# Patient Record
Sex: FEMALE | Race: WHITE
[De-identification: names, ages, dates, MRNs, and addresses within clinical notes are randomized per-mention and may not be internally consistent; named-entity substitution may affect disease eponyms.]

---

## 2021-11-20 ENCOUNTER — RX ONLY (OUTPATIENT)
Age: 51
Setting detail: RX ONLY
End: 2021-11-20

## 2021-11-20 ENCOUNTER — APPOINTMENT (RX ONLY)
Dept: URBAN - METROPOLITAN AREA CLINIC 41 | Facility: CLINIC | Age: 51
Setting detail: DERMATOLOGY
End: 2021-11-20

## 2021-11-20 DIAGNOSIS — I89.0 LYMPHEDEMA, NOT ELSEWHERE CLASSIFIED: ICD-10-CM | Status: INADEQUATELY CONTROLLED

## 2021-11-20 PROCEDURE — ? COUNSELING

## 2021-11-20 PROCEDURE — 99214 OFFICE O/P EST MOD 30 MIN: CPT

## 2021-11-20 PROCEDURE — ? ADDITIONAL NOTES

## 2021-11-20 PROCEDURE — ? PRESCRIPTION

## 2021-11-20 PROCEDURE — ? PRESCRIPTION MEDICATION MANAGEMENT

## 2021-11-20 PROCEDURE — ? MEDICATION COUNSELING

## 2021-11-20 RX ORDER — CEPHALEXIN 250 MG/5ML
POWDER, FOR SUSPENSION ORAL
Qty: 280 | Refills: 0 | COMMUNITY
Start: 2021-11-20

## 2021-11-20 RX ORDER — MUPIROCIN 20 MG/G
OINTMENT TOPICAL
Qty: 22 | Refills: 1 | COMMUNITY
Start: 2021-11-20

## 2021-11-20 RX ORDER — TRIAMCINOLONE ACETONIDE 1 MG/G
OINTMENT TOPICAL
Qty: 80 | Refills: 2 | Status: ERX

## 2021-11-20 RX ORDER — TRIAMCINOLONE ACETONIDE 1 MG/G
OINTMENT TOPICAL
Qty: 80 | Refills: 2 | COMMUNITY
Start: 2021-11-20

## 2021-11-20 RX ORDER — CEPHALEXIN 250 MG/5ML
POWDER, FOR SUSPENSION ORAL
Qty: 200 | Refills: 1 | Status: ERX

## 2021-11-20 RX ADMIN — CEPHALEXIN: 250 POWDER, FOR SUSPENSION ORAL at 00:00

## 2021-11-20 RX ADMIN — MUPIROCIN: 20 OINTMENT TOPICAL at 00:00

## 2021-11-20 RX ADMIN — TRIAMCINOLONE ACETONIDE: 1 OINTMENT TOPICAL at 00:00

## 2021-11-20 ASSESSMENT — LOCATION ZONE DERM: LOCATION ZONE: LEG

## 2021-11-20 ASSESSMENT — LOCATION SIMPLE DESCRIPTION DERM
LOCATION SIMPLE: RIGHT PRETIBIAL REGION
LOCATION SIMPLE: LEFT PRETIBIAL REGION

## 2021-11-20 ASSESSMENT — LOCATION DETAILED DESCRIPTION DERM
LOCATION DETAILED: LEFT PROXIMAL PRETIBIAL REGION
LOCATION DETAILED: RIGHT PROXIMAL PRETIBIAL REGION

## 2021-11-20 NOTE — PROCEDURE: MEDICATION COUNSELING
- Anemia likely due to LELO, and anemia of chronic disease in setting of Stage 5 CKD  - Fe panel from 2/2020 showed low total iron, low total iron binding capacity  - hemoglobin has been somewhat labile during admission; monitor closely, no sign of acute bleeding  - Continue home ferrous sulfate   - Continue epogen as per nephro Acitretin Pregnancy And Lactation Text: This medication is Pregnancy Category X and should not be given to women who are pregnant or may become pregnant in the future. This medication is excreted in breast milk.

## 2021-11-20 NOTE — PROCEDURE: MEDICATION COUNSELING
Faxed documents to [facility] [phone]   Griseofulvin Pregnancy And Lactation Text: This medication is Pregnancy Category X and is known to cause serious birth defects. It is unknown if this medication is excreted in breast milk but breast feeding should be avoided.

## 2021-11-20 NOTE — HPI: BUMPS
Is This A New Presentation, Or A Follow-Up?: Bumps
Additional History: Sharp pains in lower legs. Pt out of triamcinolone ointment. Diagnosed as elephantiasis nostras verrucosa. Never started compression therapy for this condition.\\n\\nEst to BG, new to NG

## 2021-11-20 NOTE — PROCEDURE: PRESCRIPTION MEDICATION MANAGEMENT
Initiate Treatment: Cephalexin \\nMupirocin ointment BID
Detail Level: Zone
Render In Strict Bullet Format?: No
Continue Regimen: Triamcinolone 0.1% ointment BID

## 2021-11-20 NOTE — PROCEDURE: MEDICATION COUNSELING
Please advise her that the sugar levels are high in the average 250 range  A1C is at 10 and needs to be controlled better  For now continue with the medication and low carb diet  Will discuss options for diabetes therapy next visit  Patient has a visit set up early 2/2020  Thanks  Birth Control Pills Pregnancy And Lactation Text: This medication should be avoided if pregnant and for the first 30 days post-partum.

## 2021-11-23 ENCOUNTER — RX ONLY (OUTPATIENT)
Age: 51
Setting detail: RX ONLY
End: 2021-11-23

## 2021-11-23 RX ORDER — TRIAMCINOLONE ACETONIDE 1 MG/G
OINTMENT TOPICAL
Qty: 454 | Refills: 1 | Status: ERX

## 2022-09-22 ENCOUNTER — APPOINTMENT (RX ONLY)
Dept: URBAN - METROPOLITAN AREA CLINIC 41 | Facility: CLINIC | Age: 52
Setting detail: DERMATOLOGY
End: 2022-09-22

## 2022-09-22 DIAGNOSIS — I89.0 LYMPHEDEMA, NOT ELSEWHERE CLASSIFIED: ICD-10-CM | Status: INADEQUATELY CONTROLLED

## 2022-09-22 PROCEDURE — ? ADDITIONAL NOTES

## 2022-09-22 PROCEDURE — 99214 OFFICE O/P EST MOD 30 MIN: CPT

## 2022-09-22 PROCEDURE — ? PRESCRIPTION

## 2022-09-22 PROCEDURE — ? PRESCRIPTION MEDICATION MANAGEMENT

## 2022-09-22 PROCEDURE — ? TREATMENT REGIMEN

## 2022-09-22 PROCEDURE — ? COUNSELING

## 2022-09-22 RX ORDER — CEPHALEXIN 250 MG/5ML
POWDER, FOR SUSPENSION ORAL
Qty: 200 | Refills: 1 | Status: ERX

## 2022-09-22 RX ORDER — TRIAMCINOLONE ACETONIDE 1 MG/G
OINTMENT TOPICAL
Qty: 454 | Refills: 5 | Status: ERX

## 2022-09-22 ASSESSMENT — LOCATION SIMPLE DESCRIPTION DERM
LOCATION SIMPLE: RIGHT PRETIBIAL REGION
LOCATION SIMPLE: LEFT PRETIBIAL REGION

## 2022-09-22 ASSESSMENT — LOCATION ZONE DERM: LOCATION ZONE: LEG

## 2022-09-22 ASSESSMENT — LOCATION DETAILED DESCRIPTION DERM
LOCATION DETAILED: LEFT DISTAL PRETIBIAL REGION
LOCATION DETAILED: RIGHT PROXIMAL PRETIBIAL REGION

## 2022-09-22 NOTE — PROCEDURE: ADDITIONAL NOTES
Additional Notes: Patient consent was obtained to proceed with the visit and recommended plan of care after discussion of all risks and benefits, including the risks of COVID-19 exposure.
Detail Level: Simple
Additional Notes: BG recommends compression socks to help circulation
Render Risk Assessment In Note?: no

## 2022-09-22 NOTE — PROCEDURE: PRESCRIPTION MEDICATION MANAGEMENT
Render In Strict Bullet Format?: No
Detail Level: Zone
Continue Regimen: Triamcinolone 0.1 bid\\nKephlex 500 mg bid x 2weeks

## 2023-05-18 ENCOUNTER — APPOINTMENT (RX ONLY)
Dept: URBAN - METROPOLITAN AREA CLINIC 41 | Facility: CLINIC | Age: 53
Setting detail: DERMATOLOGY
End: 2023-05-18

## 2023-05-18 DIAGNOSIS — I89.0 LYMPHEDEMA, NOT ELSEWHERE CLASSIFIED: ICD-10-CM | Status: INADEQUATELY CONTROLLED

## 2023-05-18 PROCEDURE — ? COUNSELING

## 2023-05-18 PROCEDURE — ? PRESCRIPTION MEDICATION MANAGEMENT

## 2023-05-18 PROCEDURE — 99214 OFFICE O/P EST MOD 30 MIN: CPT

## 2023-05-18 PROCEDURE — ? PRESCRIPTION

## 2023-05-18 PROCEDURE — ? ADDITIONAL NOTES

## 2023-05-18 RX ORDER — TRIAMCINOLONE ACETONIDE 1 MG/G
OINTMENT TOPICAL
Qty: 454 | Refills: 5 | Status: ERX

## 2023-05-18 ASSESSMENT — LOCATION SIMPLE DESCRIPTION DERM
LOCATION SIMPLE: RIGHT PRETIBIAL REGION
LOCATION SIMPLE: LEFT PRETIBIAL REGION

## 2023-05-18 ASSESSMENT — LOCATION ZONE DERM: LOCATION ZONE: LEG

## 2023-05-18 ASSESSMENT — LOCATION DETAILED DESCRIPTION DERM
LOCATION DETAILED: RIGHT PROXIMAL PRETIBIAL REGION
LOCATION DETAILED: LEFT DISTAL PRETIBIAL REGION

## 2023-05-18 NOTE — PROCEDURE: COUNSELING
Patient Specific Counseling (Will Not Stick From Patient To Patient): BG counsels that these are draining, but there is no active infection. BG counsels that pt needs consistent compression. BG counsels that pt needs an air compression device. BG counsels that this is likely the only thing that will help pt.
Detail Level: Detailed

## 2024-04-09 ENCOUNTER — APPOINTMENT (RX ONLY)
Dept: URBAN - METROPOLITAN AREA CLINIC 41 | Facility: CLINIC | Age: 54
Setting detail: DERMATOLOGY
End: 2024-04-09

## 2024-04-09 DIAGNOSIS — I89.0 LYMPHEDEMA, NOT ELSEWHERE CLASSIFIED: ICD-10-CM | Status: INADEQUATELY CONTROLLED

## 2024-04-09 PROCEDURE — ? PRESCRIPTION

## 2024-04-09 PROCEDURE — ? ADDITIONAL NOTES

## 2024-04-09 PROCEDURE — ? COUNSELING

## 2024-04-09 PROCEDURE — ? PRESCRIPTION MEDICATION MANAGEMENT

## 2024-04-09 PROCEDURE — 99214 OFFICE O/P EST MOD 30 MIN: CPT

## 2024-04-09 RX ORDER — CEPHALEXIN 250 MG/5ML
POWDER, FOR SUSPENSION ORAL
Qty: 1300 | Refills: 0 | Status: ERX

## 2024-04-09 ASSESSMENT — LOCATION SIMPLE DESCRIPTION DERM
LOCATION SIMPLE: RIGHT PRETIBIAL REGION
LOCATION SIMPLE: LEFT PRETIBIAL REGION

## 2024-04-09 ASSESSMENT — LOCATION DETAILED DESCRIPTION DERM
LOCATION DETAILED: LEFT DISTAL PRETIBIAL REGION
LOCATION DETAILED: RIGHT PROXIMAL PRETIBIAL REGION

## 2024-04-09 ASSESSMENT — LOCATION ZONE DERM: LOCATION ZONE: LEG

## 2024-04-09 NOTE — PROCEDURE: PRESCRIPTION MEDICATION MANAGEMENT
Initiate Treatment: Cephalexin 250/5 for 4 times a day.
Render In Strict Bullet Format?: No
Detail Level: Zone
Continue Regimen: Triamcinolone 0.1 bid

## 2024-04-09 NOTE — PROCEDURE: ADDITIONAL NOTES
Additional Notes: BG states that pt should keep legs elevated and pt should keep up with Pneumatic compression. BG counsels pt that without the compression pt runs the risk of cellulitis. \\nThe L leg is infected today and pt can take course of antibiotics to calm it down. Pt states she is unable to take pills, BG states pt can take oral medication instead.
Detail Level: Simple
Render Risk Assessment In Note?: no

## 2025-02-21 ENCOUNTER — APPOINTMENT (OUTPATIENT)
Dept: URBAN - METROPOLITAN AREA CLINIC 41 | Facility: CLINIC | Age: 55
Setting detail: DERMATOLOGY
End: 2025-02-21

## 2025-02-21 ENCOUNTER — RX ONLY (RX ONLY)
Age: 55
End: 2025-02-21

## 2025-02-21 DIAGNOSIS — I89.0 LYMPHEDEMA, NOT ELSEWHERE CLASSIFIED: ICD-10-CM | Status: INADEQUATELY CONTROLLED

## 2025-02-21 PROCEDURE — ? PRESCRIPTION

## 2025-02-21 PROCEDURE — ? ORDER TESTS

## 2025-02-21 PROCEDURE — ? COUNSELING

## 2025-02-21 PROCEDURE — ? PRESCRIPTION MEDICATION MANAGEMENT

## 2025-02-21 PROCEDURE — 99214 OFFICE O/P EST MOD 30 MIN: CPT

## 2025-02-21 RX ORDER — TRIAMCINOLONE ACETONIDE 1 MG/G
OINTMENT TOPICAL
Qty: 454 | Refills: 5 | Status: ERX

## 2025-02-21 RX ORDER — CEPHALEXIN 250 MG/5ML
POWDER, FOR SUSPENSION ORAL
Qty: 420 | Refills: 0 | Status: ERX

## 2025-02-21 RX ORDER — COLLAGENASE SANTYL 250 [ARB'U]/G
OINTMENT TOPICAL
Qty: 90 | Refills: 3 | Status: ERX | COMMUNITY
Start: 2025-02-21

## 2025-02-21 RX ADMIN — COLLAGENASE SANTYL: 250 OINTMENT TOPICAL at 00:00

## 2025-02-21 ASSESSMENT — LOCATION SIMPLE DESCRIPTION DERM
LOCATION SIMPLE: RIGHT PRETIBIAL REGION
LOCATION SIMPLE: LEFT PRETIBIAL REGION

## 2025-02-21 ASSESSMENT — LOCATION DETAILED DESCRIPTION DERM
LOCATION DETAILED: RIGHT PROXIMAL PRETIBIAL REGION
LOCATION DETAILED: LEFT DISTAL PRETIBIAL REGION

## 2025-02-21 ASSESSMENT — LOCATION ZONE DERM: LOCATION ZONE: LEG

## 2025-02-21 NOTE — PROCEDURE: ORDER TESTS
Bill For Surgical Tray: no
Performing Laboratory: [id number]
Expected Date Of Service: 02/21/2025
Billing Type: Third-Party Bill

## 2025-02-21 NOTE — PROCEDURE: PRESCRIPTION MEDICATION MANAGEMENT
Initiate Treatment: Cephalexin 250/5 2 tsp PO BID\\nSANTYL BID on ulcer
Render In Strict Bullet Format?: No
Detail Level: Zone
Plan: Secondary infection - will check culture and change antibiotic if needed. Discussed importance of establishing care with PCP to check for underlining causes. Referred to wound clinic for management of ulcers on left lower leg. No signs of necrosis.
Continue Regimen: Triamcinolone 0.1 bid

## 2025-02-21 NOTE — PROCEDURE: COUNSELING
Patient Specific Counseling (Will Not Stick From Patient To Patient): -\\nPatient notes that she has sensation in the toes or both feet. Patient tried to elevate legs, but does not use compression socks. Patient takes chewable aspirin for pain, patient cannot take pills. Patient needs to be referred to [facility] wound clinic. Patient should avoid using triamcinolone for the next 2 weeks and should not apply triamcinolone to the ulcer. Patient should coat the entire leg with Vaseline and some kind of compression. Patient needs to be seen by her primary care doctor regarding an underlying reason for the fluid pooling.
Detail Level: Detailed

## 2025-03-03 ENCOUNTER — RX ONLY (RX ONLY)
Age: 55
End: 2025-03-03

## 2025-03-03 RX ORDER — MUPIROCIN 20 MG/G
OINTMENT TOPICAL
Qty: 22 | Refills: 3 | Status: ERX